# Patient Record
Sex: MALE | Race: WHITE | NOT HISPANIC OR LATINO | Employment: FULL TIME | URBAN - METROPOLITAN AREA
[De-identification: names, ages, dates, MRNs, and addresses within clinical notes are randomized per-mention and may not be internally consistent; named-entity substitution may affect disease eponyms.]

---

## 2017-09-14 ENCOUNTER — HISTORICAL (OUTPATIENT)
Dept: ADMINISTRATIVE | Facility: HOSPITAL | Age: 22
End: 2017-09-14

## 2017-09-14 LAB
ERYTHROCYTE [DISTWIDTH] IN BLOOD BY AUTOMATED COUNT: 11.5 % (ref 11.5–17)
HCT VFR BLD AUTO: 46.6 % (ref 42–52)
HGB BLD-MCNC: 16.5 GM/DL (ref 14–18)
MCH RBC QN AUTO: 32.7 PG (ref 27–31)
MCHC RBC AUTO-ENTMCNC: 35.4 GM/DL (ref 33–36)
MCV RBC AUTO: 92.3 FL (ref 80–94)
PLATELET # BLD AUTO: 153 X10(3)/MCL (ref 130–400)
PMV BLD AUTO: 10.3 FL (ref 9.4–12.4)
RBC # BLD AUTO: 5.05 X10(6)/MCL (ref 4.7–6.1)
WBC # SPEC AUTO: 9.6 X10(3)/MCL (ref 4.5–11.5)

## 2019-12-07 ENCOUNTER — HOSPITAL ENCOUNTER (OUTPATIENT)
Dept: MEDSURG UNIT | Facility: HOSPITAL | Age: 24
End: 2019-12-08
Attending: SURGERY | Admitting: SURGERY

## 2019-12-07 LAB
ABS NEUT (OLG): 15.08 X10(3)/MCL (ref 2.1–9.2)
ALBUMIN SERPL-MCNC: 4.7 GM/DL (ref 3.4–5)
ALBUMIN/GLOB SERPL: 1.2 {RATIO}
ALP SERPL-CCNC: 61 UNIT/L (ref 50–136)
ALT SERPL-CCNC: 33 UNIT/L (ref 12–78)
APPEARANCE, UA: CLEAR
AST SERPL-CCNC: 19 UNIT/L (ref 15–37)
BACTERIA SPEC CULT: ABNORMAL /HPF
BASOPHILS # BLD AUTO: 0.1 X10(3)/MCL (ref 0–0.2)
BASOPHILS NFR BLD AUTO: 0 %
BILIRUB SERPL-MCNC: 6.2 MG/DL (ref 0.2–1)
BILIRUB UR QL STRIP: NEGATIVE
BILIRUBIN DIRECT+TOT PNL SERPL-MCNC: 0.3 MG/DL (ref 0–0.2)
BILIRUBIN DIRECT+TOT PNL SERPL-MCNC: 5.9 MG/DL (ref 0–0.8)
BUN SERPL-MCNC: 11 MG/DL (ref 7–18)
CALCIUM SERPL-MCNC: 10.5 MG/DL (ref 8.5–10.1)
CHLORIDE SERPL-SCNC: 99 MMOL/L (ref 98–107)
CO2 SERPL-SCNC: 24 MMOL/L (ref 21–32)
COLOR UR: YELLOW
CREAT SERPL-MCNC: 1.13 MG/DL (ref 0.7–1.3)
EOSINOPHIL # BLD AUTO: 0 X10(3)/MCL (ref 0–0.9)
EOSINOPHIL NFR BLD AUTO: 0 %
ERYTHROCYTE [DISTWIDTH] IN BLOOD BY AUTOMATED COUNT: 11.4 % (ref 11.5–17)
GLOBULIN SER-MCNC: 4 GM/DL (ref 2.4–3.5)
GLUCOSE (UA): NEGATIVE
GLUCOSE SERPL-MCNC: 129 MG/DL (ref 74–106)
HCT VFR BLD AUTO: 46.3 % (ref 42–52)
HGB BLD-MCNC: 16.7 GM/DL (ref 14–18)
HGB UR QL STRIP: NEGATIVE
KETONES UR QL STRIP: ABNORMAL
LEUKOCYTE ESTERASE UR QL STRIP: NEGATIVE
LIPASE SERPL-CCNC: 78 UNIT/L (ref 73–393)
LYMPHOCYTES # BLD AUTO: 2 X10(3)/MCL (ref 0.6–4.6)
LYMPHOCYTES NFR BLD AUTO: 11 %
MCH RBC QN AUTO: 31.1 PG (ref 27–31)
MCHC RBC AUTO-ENTMCNC: 36.1 GM/DL (ref 33–36)
MCV RBC AUTO: 86.2 FL (ref 80–94)
MONOCYTES # BLD AUTO: 1.3 X10(3)/MCL (ref 0.1–1.3)
MONOCYTES NFR BLD AUTO: 7 %
NEUTROPHILS # BLD AUTO: 15.08 X10(3)/MCL (ref 2.1–9.2)
NEUTROPHILS NFR BLD AUTO: 81 %
NITRITE UR QL STRIP: NEGATIVE
PH UR STRIP: 8.5 [PH] (ref 5–9)
PLATELET # BLD AUTO: 162 X10(3)/MCL (ref 130–400)
PMV BLD AUTO: 10.2 FL (ref 9.4–12.4)
POTASSIUM SERPL-SCNC: 3.5 MMOL/L (ref 3.5–5.1)
PROT SERPL-MCNC: 8.7 GM/DL (ref 6.4–8.2)
PROT UR QL STRIP: NEGATIVE
RBC # BLD AUTO: 5.37 X10(6)/MCL (ref 4.7–6.1)
RBC #/AREA URNS HPF: ABNORMAL /[HPF]
SODIUM SERPL-SCNC: 133 MMOL/L (ref 136–145)
SP GR UR STRIP: 1.04 (ref 1–1.03)
SQUAMOUS EPITHELIAL, UA: ABNORMAL
UROBILINOGEN UR STRIP-ACNC: 1
WBC # SPEC AUTO: 18.6 X10(3)/MCL (ref 4.5–11.5)
WBC #/AREA URNS HPF: ABNORMAL /HPF

## 2021-03-05 ENCOUNTER — HISTORICAL (OUTPATIENT)
Dept: ADMINISTRATIVE | Facility: HOSPITAL | Age: 26
End: 2021-03-05

## 2021-03-05 LAB
ABS NEUT (OLG): 4.16 X10(3)/MCL (ref 2.1–9.2)
ALBUMIN SERPL-MCNC: 4.5 GM/DL (ref 3.5–5)
ALBUMIN/GLOB SERPL: 1.6 RATIO (ref 1.1–2)
ALP SERPL-CCNC: 57 UNIT/L (ref 40–150)
ALT SERPL-CCNC: 40 UNIT/L (ref 0–55)
APPEARANCE, UA: CLEAR
AST SERPL-CCNC: 29 UNIT/L (ref 5–34)
BACTERIA SPEC CULT: ABNORMAL /HPF
BASOPHILS # BLD AUTO: 0 X10(3)/MCL (ref 0–0.2)
BASOPHILS NFR BLD AUTO: 1 %
BILIRUB SERPL-MCNC: 1.6 MG/DL
BILIRUB UR QL STRIP: NEGATIVE
BILIRUBIN DIRECT+TOT PNL SERPL-MCNC: 0.5 MG/DL (ref 0–0.5)
BILIRUBIN DIRECT+TOT PNL SERPL-MCNC: 1.1 MG/DL (ref 0–0.8)
BUN SERPL-MCNC: 17.3 MG/DL (ref 8.9–20.6)
CALCIUM SERPL-MCNC: 9.2 MG/DL (ref 8.4–10.2)
CHLORIDE SERPL-SCNC: 102 MMOL/L (ref 98–107)
CO2 SERPL-SCNC: 27 MMOL/L (ref 22–29)
COLOR UR: YELLOW
CREAT SERPL-MCNC: 0.88 MG/DL (ref 0.73–1.18)
EOSINOPHIL # BLD AUTO: 0.1 X10(3)/MCL (ref 0–0.9)
EOSINOPHIL NFR BLD AUTO: 1 %
ERYTHROCYTE [DISTWIDTH] IN BLOOD BY AUTOMATED COUNT: 11.6 % (ref 11.5–17)
GLOBULIN SER-MCNC: 2.9 GM/DL (ref 2.4–3.5)
GLUCOSE (UA): NEGATIVE
GLUCOSE SERPL-MCNC: 74 MG/DL (ref 74–100)
HCT VFR BLD AUTO: 45.7 % (ref 42–52)
HGB BLD-MCNC: 15.4 GM/DL (ref 14–18)
HGB UR QL STRIP: NEGATIVE
KETONES UR QL STRIP: ABNORMAL
LEUKOCYTE ESTERASE UR QL STRIP: NEGATIVE
LYMPHOCYTES # BLD AUTO: 2.5 X10(3)/MCL (ref 0.6–4.6)
LYMPHOCYTES NFR BLD AUTO: 34 %
MCH RBC QN AUTO: 32.1 PG (ref 27–31)
MCHC RBC AUTO-ENTMCNC: 33.7 GM/DL (ref 33–36)
MCV RBC AUTO: 95.2 FL (ref 80–94)
MONOCYTES # BLD AUTO: 0.4 X10(3)/MCL (ref 0.1–1.3)
MONOCYTES NFR BLD AUTO: 6 %
NEUTROPHILS # BLD AUTO: 4.16 X10(3)/MCL (ref 2.1–9.2)
NEUTROPHILS NFR BLD AUTO: 58 %
NITRITE UR QL STRIP: NEGATIVE
PH UR STRIP: 5 [PH] (ref 5–9)
PLATELET # BLD AUTO: 185 X10(3)/MCL (ref 130–400)
PMV BLD AUTO: 10.7 FL (ref 9.4–12.4)
POTASSIUM SERPL-SCNC: 4.3 MMOL/L (ref 3.5–5.1)
PROT SERPL-MCNC: 7.4 GM/DL (ref 6.4–8.3)
PROT UR QL STRIP: NEGATIVE
RBC # BLD AUTO: 4.8 X10(6)/MCL (ref 4.7–6.1)
RBC #/AREA URNS HPF: ABNORMAL /[HPF]
SODIUM SERPL-SCNC: 138 MMOL/L (ref 136–145)
SP GR UR STRIP: 1.03 (ref 1–1.03)
SQUAMOUS EPITHELIAL, UA: ABNORMAL
UROBILINOGEN UR STRIP-ACNC: 0.2
WBC # SPEC AUTO: 7.2 X10(3)/MCL (ref 4.5–11.5)
WBC #/AREA URNS HPF: ABNORMAL /HPF

## 2021-05-09 ENCOUNTER — OFFICE VISIT (OUTPATIENT)
Dept: URGENT CARE | Facility: CLINIC | Age: 26
End: 2021-05-09
Payer: COMMERCIAL

## 2021-05-09 VITALS
HEART RATE: 83 BPM | RESPIRATION RATE: 14 BRPM | WEIGHT: 205 LBS | OXYGEN SATURATION: 95 % | DIASTOLIC BLOOD PRESSURE: 79 MMHG | BODY MASS INDEX: 24.96 KG/M2 | HEIGHT: 76 IN | TEMPERATURE: 98 F | SYSTOLIC BLOOD PRESSURE: 129 MMHG

## 2021-05-09 DIAGNOSIS — J02.9 SORE THROAT: ICD-10-CM

## 2021-05-09 DIAGNOSIS — R68.83 CHILLS (WITHOUT FEVER): ICD-10-CM

## 2021-05-09 DIAGNOSIS — Z86.16 PERSONAL HISTORY OF COVID-19: ICD-10-CM

## 2021-05-09 DIAGNOSIS — Z11.52 ENCOUNTER FOR SCREENING FOR COVID-19: ICD-10-CM

## 2021-05-09 DIAGNOSIS — J02.9 ACUTE PHARYNGITIS, UNSPECIFIED ETIOLOGY: Primary | ICD-10-CM

## 2021-05-09 LAB
CTP QC/QA: YES
HETEROPH AB SER QL: NEGATIVE
MOLECULAR STREP A: NEGATIVE
SARS-COV-2 RDRP RESP QL NAA+PROBE: NEGATIVE

## 2021-05-09 PROCEDURE — 99203 OFFICE O/P NEW LOW 30 MIN: CPT | Mod: S$GLB,,, | Performed by: PHYSICIAN ASSISTANT

## 2021-05-09 PROCEDURE — 87651 POCT STREP A MOLECULAR: ICD-10-PCS | Mod: QW,S$GLB,, | Performed by: PHYSICIAN ASSISTANT

## 2021-05-09 PROCEDURE — U0002 COVID-19 LAB TEST NON-CDC: HCPCS | Mod: QW,S$GLB,, | Performed by: PHYSICIAN ASSISTANT

## 2021-05-09 PROCEDURE — 86308 HETEROPHILE ANTIBODY SCREEN: CPT | Mod: QW,S$GLB,, | Performed by: PHYSICIAN ASSISTANT

## 2021-05-09 PROCEDURE — 87651 STREP A DNA AMP PROBE: CPT | Mod: QW,S$GLB,, | Performed by: PHYSICIAN ASSISTANT

## 2021-05-09 PROCEDURE — U0002: ICD-10-PCS | Mod: QW,S$GLB,, | Performed by: PHYSICIAN ASSISTANT

## 2021-05-09 PROCEDURE — 3008F BODY MASS INDEX DOCD: CPT | Mod: CPTII,S$GLB,, | Performed by: PHYSICIAN ASSISTANT

## 2021-05-09 PROCEDURE — 3008F PR BODY MASS INDEX (BMI) DOCUMENTED: ICD-10-PCS | Mod: CPTII,S$GLB,, | Performed by: PHYSICIAN ASSISTANT

## 2021-05-09 PROCEDURE — 86308 POCT INFECTIOUS MONONUCLEOSIS: ICD-10-PCS | Mod: QW,S$GLB,, | Performed by: PHYSICIAN ASSISTANT

## 2021-05-09 PROCEDURE — 99203 PR OFFICE/OUTPT VISIT, NEW, LEVL III, 30-44 MIN: ICD-10-PCS | Mod: S$GLB,,, | Performed by: PHYSICIAN ASSISTANT

## 2021-05-09 RX ORDER — AMOXICILLIN 500 MG/1
500 CAPSULE ORAL EVERY 12 HOURS
Qty: 20 CAPSULE | Refills: 0 | Status: SHIPPED | OUTPATIENT
Start: 2021-05-09 | End: 2021-05-19

## 2022-04-30 NOTE — PROGRESS NOTES
Patient:   Mervin Puri III            MRN: [id number]            FIN: [id number]               Age:   24 years     Sex:  Male     :  1995   Associated Diagnoses:   None   Author:   Brenadette GALAN, [facility]      HX-  Admitted to surgery, started on IV antibiotics, and taken to OR for laparoscopic appendectomy. Patient tolerated procedure well. By the following morning he was tolerating a regular diet, had well controlled pain on PO meds, was voiding and ambulating without issue, and was otherwise stable for discharge    Patient presents for the post-op follow up after a Appendectomy Laparoscopic. Patient is doing great. Denies of fever, constipation, N and V. Eating good.     PE-  VSS AF  ABD- Soft , non tender, non distended. Incision looks clean, dry, intact healed well.      Path-  APPENDIX, APPENDECTOMY:    ACUTE APPENDICITIS        A/P-   The patient is doing great. Advise to avoid heavy weight bearing for 4-6 weeks        F/U-   PRN          Alaina Fleming  [employer]  [facility]1

## 2022-04-30 NOTE — ED PROVIDER NOTES
"   Patient:   Mervin Puri III            MRN: [id number]            FIN: [id number]               Age:   24 years     Sex:  Male     :  1995   Associated Diagnoses:   Acute appendicitis   Author:   Nazario Ventura MD      Basic Information   Time seen: Date & time 2019 00:24:00.   History source: Patient.   Arrival mode: Private vehicle.   History limitation: None.      History of Present Illness   The patient presents with 25 y/o  male presents the ED c/o sharp bilateral lower quadrant pain that began Wednesday (19). Pt reports associated nausea, subjective fever and back pain, however denies vomiting. He also states the pain is more severe on the RLQ than the LLQ with palpation. He notes he has not eaten since Wednesday night, but has been drinking water and Pedialyte.He states he took a laxative and his last bowel movement was on 19 at approximately 2000 that he described as "watery."  Pt states the pain is similar to that of previous constipation, however, it is now more severe. .  The onset was 3  days ago.  The course/duration of symptoms is constant.  The character of symptoms is sharp and pressure.  The degree at onset was moderate.  The Location of pain at onset was bilateral, lower and abdominal.  The degree at present is moderate.  The Location of pain at present is bilateral, lower and abdominal.  Radiating pain: none. The exacerbating factor is none.  The relieving factor is none.  Therapy today: none.  Risk factors consist of none.  Associated symptoms: nausea, back pain, fever (subjective) and denies vomiting.        Review of Systems   Constitutional symptoms:  Fever (subjective)No chills, no sweats, no weakness, no fatigue, no decreased activity.    Skin symptoms:  No jaundice, no rash, no pruritus, no abrasions, no breakdown, no burns, no dryness, no petechiae, no lesion.    Eye symptoms:  Vision unchangedNo pain, no blurred vision.    [facility] symptoms:  " No ear pain, no sore throat, no nasal congestion, no sinus pain.    Respiratory symptoms:  No shortness of breath, no orthopnea, no cough, no hemoptysis, no stridor, no wheezing.    Cardiovascular symptoms:  No chest pain, no palpitations, no tachycardia, no syncope, no diaphoresis, no peripheral edema.    Gastrointestinal symptoms:  NauseaNo abdominal pain, no vomiting, no diarrhea, no constipation, no rectal bleeding, no rectal pain.    Genitourinary symptoms:  No dysuria, no hematuria.    Musculoskeletal symptoms:  Back painNo Muscle pain, no Joint pain   Neurologic symptoms:  No headache, no dizziness, no altered level of consciousness, no numbness, no tingling, no weakness.              Additional review of systems information: All systems reviewed as documented in chart.      Health Status   Allergies: No known allergies.   Medications:  (Selected)   Inpatient Medications  Ordered  Dilaudid: 1 mg, form: Injection, IV Slow, Once, first dose 12/07/19 0:29:00 CST, stop date 12/07/19 0:29:00 CST, STAT, over at least 2--3 minutes  NS (0.9% Sodium Chloride) Infusion 1,000 mL: 1,000 mL, 1,000 mL, IV, 1,000 mL/hr, start date 12/07/19 0:29:00 CST, 2.23, m2  Zofran 2 mg/mL injectable solution: 4 mg, form: Injection, IV Push, Once, first dose 12/07/19 0:29:00 CST, stop date 12/07/19 0:29:00 CST, STAT  Documented Medications  Documented  clindamycin 300 mg oral capsule: 300 mg = 1 cap(s), Oral, QID, # 28 cap(s), 0 Refill(s).      Past Medical/ Family/ Social History   Medical history: Negative.   Surgical history: Negative.   Family history: brother- cholecystectomy.   Social history: Tobacco use: Denies, Drug use: Denies.      Physical Examination               Vital Signs   Vital Signs   12/7/2019 0:15 CST       Temperature Oral          37.0 DegC                             Temperature Oral (calculated)             98.60 DegF                             Peripheral Pulse Rate     99 bpm                              Respiratory Rate          18 br/min                             SpO2                      100 %                             Oxygen Therapy            Room air                             Systolic Blood Pressure   142 mmHg  HI                             Diastolic Blood Pressure  81 mmHg  .   Measurements   12/7/2019 0:15 CST       Weight Dosing             92.5 kg                             Weight Measured and Calculated in Lbs     203.93 lb                             Weight Estimated          92.5 kg                             Height/Length Dosing      193.04 cm                             Height/Length Estimated   193.04 cm                             Body Mass Index Estimated 24.82 kg/m2  .   Basic Oxygen Information   12/7/2019 0:15 CST       SpO2                      100 %                             Oxygen Therapy            Room air  .   General:  Alert, appears uncomfortable   Neurological:  Alert and oriented to person, place, time, and situation, No focal neurological deficit observed, CN II-XII intact, normal sensory observed, normal motor observed, normal speech observed, normal coordination observed   Skin:  Warm, dry, no rash   Head:  Normocephalic, atraumatic   Neck:  Supple, trachea midline, no tenderness   Eye:  Extraocular movements are intact, vision unchanged   Ears, nose, mouth and throat:  No pharyngeal erythema or exudate, dry mucous membranes.    Respiratory:  Lungs are clear to auscultation, respirations are non-labored, breath sounds are equal, Symmetrical chest wall expansion.    Cardiovascular:  Regular rate and rhythm, No murmur, Normal peripheral perfusion   Musculoskeletal:  Normal ROM, normal strength, no tenderness, no swelling, no deformity.    Gastrointestinal:  Soft, Non distended, Normal bowel sounds, No organomegaly,   Moderate tenderness to bilateral lower quadrant with palpation  positive voluntary guarding, Rebound: Negative.    Psychiatric:  Cooperative, appropriate mood  & affect      Medical Decision Making   Documents reviewed:  Emergency department nurses' notes.   Results review:  Lab results : Lab View   12/7/2019 0:36 CST       Sodium Lvl                133 mmol/L  LOW                             Potassium Lvl             3.5 mmol/L                             Chloride                  99 mmol/L                             CO2                       24.0 mmol/L                             Calcium Lvl               10.5 mg/dL  HI                             Glucose Lvl               129 mg/dL  HI                             BUN                       11.0 mg/dL                             Creatinine                1.13 mg/dL                             eGFR-AA                   >60 mL/min/1.73 m2  NA                             eGFR-VIVIAN                  >60 mL/min/1.73 m2  NA                             Bili Total                6.2 mg/dL  HI                             Bili Direct               0.30 mg/dL  HI                             Bili Indirect             5.90 mg/dL  HI                             AST                       19 unit/L                             ALT                       33 unit/L                             Alk Phos                  61 unit/L                             Total Protein             8.7 gm/dL  HI                             Albumin Lvl               4.70 gm/dL                             Globulin                  4.00 gm/dL  HI                             A/G Ratio                 1.2  NA                             Lipase Lvl                78 unit/L                             WBC                       18.6 x10(3)/mcL  HI                             RBC                       5.37 x10(6)/mcL                             Hgb                       16.7 gm/dL                             Hct                       46.3 %                             Platelet                  162 x10(3)/mcL                             MCV                       86.2  fL                             MCH                       31.1 pg  HI                             MCHC                      36.1 gm/dL  HI                             RDW                       11.4 %  LOW                             MPV                       10.2 fL                             Abs Neut                  15.08 x10(3)/mcL  HI                             Neutro Auto               81 %  NA                             Lymph Auto                11 %  NA                             Mono Auto                 7 %  NA                             Eos Auto                  0 %  NA                             Abs Eos                   0.0 x10(3)/mcL                             Basophil Auto             0 %  NA                             Abs Neutro                15.08 x10(3)/mcL  HI                             Abs Lymph                 2.0 x10(3)/mcL                             Abs Mono                  1.3 x10(3)/mcL                             Abs Baso                  0.1 x10(3)/mcL  .   Radiology results:  Discussed with radiologist, Acute appendicitis per radiology.       Reexamination/ Reevaluation   Time: 12/7/2019 01:42:00 .   Course: well controlled.      Impression and Plan   Diagnosis   Acute appendicitis ([id number] K35.80)      Calls-Consults   -  12/7/2019 01:43:00 , Surgery to admit.    Plan   Condition: Stable.    Disposition: Admit time  12/7/2019 01:43:00, Admit to Inpatient Unit.    Counseled: Patient, Family, Regarding diagnosis, Regarding diagnostic results, Regarding treatment plan, Patient indicated understanding of instructions.    Notes: I, Yuliya Disla, acted solely as a scribe for and in the presence of Dr. Ventura who performed the service., I acknowledged that the documentation on this chart was provided by a scribe on the date of service noted above and that the documentation in the chart accurately reflects work and decisions made by me alone..

## 2022-05-04 NOTE — HISTORICAL [facility]
This is a historical note converted from [facility]. Formatting and pictures may have been removed.  Please reference [facility] for original formatting and attached multimedia. Chief Complaint  C/O MIDLINE LOWER ABD PAIN X 3 DAYS. REPORTS LAST BM YESTERDAY-WATERY. ALSO REPORT BILAT BACK PAIN.  History of Present Illness  Patient is a 24 year old healthy male who presented to the ED for evaluation of abdominal pain x 3 days. Patient says that the pain started on Wednesday and has been getting progressively worse since then.? He had to leave work early today because of the severity and was doubled over, unable to stand up straight, by the time he got home. He says pain is worse on the right side, and he has never experienced anything like this before.? Associated nausea and anorexia. Minimal po intake since Wednesday. Denies fever/chills, dysuria,?and changes in bowel movements.  Review of Systems  10 point ROS negative other than stated above in HPI  Physical Exam  Vitals & Measurements  T:?37.0? ?C (Oral)? HR:?99(Peripheral)? RR:?18? BP:?142/81? SpO2:?100%? WT:?92.5?kg?  General: alert & oriented x4, NAD  HEENT: NCAT  Respiratory: non-labored breathing  Cardiovascular: RRR  Abdomen: soft, ND, +TTP in RLQ, +Rovsings sign. No signs of generalized peritonitis.  Ext: Warm, well perfused  Skin: No rashes, wounds, or other skin lesions  Neuro: No focal deficits  Assessment/Plan  24 year old male with acute appendicitis  - Admit to surgery  - NPO, IVF  - IV Zosyn  - Pain medication and anti emetics PRN  - Plan for laparoscopic appendectomy in AM. Discussed surgery in detail with patient, including risks/benefits/alternatives to surgery. He is in agreement with plan.  ?  Kaila Johnson MD  [employer] General Surgery PGY-4   Problem List/Past Medical History  Ongoing  No chronic problems  Historical  No qualifying data  Procedure/Surgical History  Pectus excavatum surgery x2  Medications  Inpatient  Lactated Ringers Infusion 1,000 mL,  1000 mL, IV  morphine 4 mg/mL preservative-free intravenous solution, 4 mg= 1 mL, IV Push, q3hr, PRN  NS (0.9% Sodium Chloride) Infusion 1,000 mL, 1000 mL, IV  piperacillin-tazobactam  Zofran 2 mg/mL injectable solution, 4 mg= 2 mL, IV Push, q4hr, PRN  Home  clindamycin 300 mg oral capsule, 300 mg= 1 cap(s), Oral, QID  Allergies  No Known Allergies  Social History  Tobacco  Never (less than 100 in lifetime), N/A, 12/07/2019  Family History  Non-contributory  Lab Results  All labs reviewed  WBC 18  Diagnostic Results  CT abdomen/pelvis -  Appendix dilated to 1.2 cm with surrounding inflammation, consistent with appendicitis. No radiologic evidence of perforation or abscess.      set up lappy in AM

## 2022-05-04 NOTE — HISTORICAL [facility]
This is a historical note converted from [place]. Formatting and pictures may have been removed.  Please reference [place] for original formatting and attached multimedia. Admit and Discharge Dates  Admit Date: 12/07/2019  Discharge Date: 12/08/2019  Physicians  Attending Physician - Salvador GALAN, Steven GIBBS  Admitting Physician - Salvador GALAN, Steven GIBBS  Primary Care Physician - Physician MD, Non Staff  Discharge Diagnosis  Acute appendicitis?K35.80,?Acute appendicitis?K35.80  Surgical Procedures  12/07/2019 - [id number] - Appendectomy Laparoscopic  Immunizations  No immunizations recorded for this visit.  Admission Information  24 year old male who presented to ED with 3 day history of abdominal pain. WBC 18, CT scan consistent with acute appendicitis.  Hospital Course  Admitted to surgery, started on IV antibiotics, and taken to OR for laparoscopic appendectomy. Patient tolerated procedure well.? By the following morning he was tolerating a regular diet, had well controlled pain on PO meds, was voiding and ambulating without issue, and was otherwise stable for discharge  Significant Findings  Acute appendicitis  Time Spent on discharge  30 minutes  Objective  Vitals & Measurements  T:?36.7? ?C (Oral)? TMIN:?36.3? ?C (Temporal Artery)? TMAX:?36.9? ?C (Oral)? HR:?68(Peripheral)? RR:?18? BP:?118/72? SpO2:?96%? BMI:?24.82?  Physical Exam  Afebrile, VSS  Gen: NAD, AAOx3  Neuro: CN II-XII grossly intact  HEENT: NCAT, PERRLA, EOMI  CV: Normal rate, regular rhythm, no murmurs/gallops/rubs  Resp: Clear to auscultation bilaterally, no wheezes or rhonchi  Abdomen: soft, aTTP, nondistended  Extremities: warm and well perfused, no clubbing or edema  Wounds: trocar sites with surgical dressings in place, c/d/i  Patient Discharge Condition  Good  Discharge Disposition  Home   Discharge Medication Reconciliation  Prescribed  gabapentin (gabapentin 300 mg oral capsule)?300 mg, Oral, BID  methocarbamol (methocarbamol 500 mg oral  tablet)?1,000 mg, Oral, QID  traMADol (traMADol 50 mg oral tablet)?50 mg, Oral, q4hr, PRN pain, moderate  Continue  clindamycin (clindamycin 300 mg oral capsule)?300 mg, Oral, QID  Education and Orders Provided  Laparoscopic Appendectomy, Adult, Care After  Discharge - 12/08/19 11:08:00 CST, Home, Give all scheduled vaccinations prior to discharge.?  Discharge Activity - No Heavy Lifting?  Discharge Diet - Regular?  Follow up  [facility] appt in 3-4 weeks      some minor soreness and complaints.? vitals OK.? d/c home and f/u in clinic